# Patient Record
Sex: FEMALE | Race: WHITE | NOT HISPANIC OR LATINO | Employment: PART TIME | URBAN - METROPOLITAN AREA
[De-identification: names, ages, dates, MRNs, and addresses within clinical notes are randomized per-mention and may not be internally consistent; named-entity substitution may affect disease eponyms.]

---

## 2017-03-27 ENCOUNTER — GENERIC CONVERSION - ENCOUNTER (OUTPATIENT)
Dept: OTHER | Facility: OTHER | Age: 62
End: 2017-03-27

## 2017-04-21 ENCOUNTER — GENERIC CONVERSION - ENCOUNTER (OUTPATIENT)
Dept: OTHER | Facility: OTHER | Age: 62
End: 2017-04-21

## 2017-10-09 ENCOUNTER — HOSPITAL ENCOUNTER (EMERGENCY)
Facility: HOSPITAL | Age: 62
Discharge: HOME | End: 2017-10-09
Attending: EMERGENCY MEDICINE | Admitting: EMERGENCY MEDICINE
Payer: COMMERCIAL

## 2017-10-09 VITALS
SYSTOLIC BLOOD PRESSURE: 133 MMHG | WEIGHT: 199 LBS | HEART RATE: 96 BPM | OXYGEN SATURATION: 95 % | TEMPERATURE: 97.7 F | DIASTOLIC BLOOD PRESSURE: 70 MMHG | RESPIRATION RATE: 18 BRPM

## 2017-10-09 DIAGNOSIS — S16.1XXA STRAIN OF NECK MUSCLE, INITIAL ENCOUNTER: ICD-10-CM

## 2017-10-09 DIAGNOSIS — S09.90XA INJURY OF HEAD, INITIAL ENCOUNTER: Primary | ICD-10-CM

## 2017-10-09 PROCEDURE — 99283 EMERGENCY DEPT VISIT LOW MDM: CPT
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
## 2017-10-09 NOTE — DISCHARGE INSTRUCTIONS
Cervical Strain   WHAT YOU NEED TO KNOW:   A cervical strain is a stretched or torn muscle or tendon in your neck  Tendons are strong tissues that connect muscles to bones  Common causes of cervical strains include a car accident, a fall, or a sports injury  DISCHARGE INSTRUCTIONS:   Return to the emergency department if:   · You have pain or numbness from your shoulder down to your hand  · You have problems with your vision, hearing, or balance  · You feel confused or cannot concentrate  · You have problems with movement and strength  Contact your healthcare provider if:   · You have increased swelling or pain in your neck  · You have questions or concerns about your condition or care  Medicines: You may need any of the following:  · Acetaminophen  decreases pain and fever  It is available without a doctor's order  Ask how much to take and how often to take it  Follow directions  Read the labels of all other medicines you are using to see if they also contain acetaminophen, or ask your doctor or pharmacist  Acetaminophen can cause liver damage if not taken correctly  Do not use more than 4 grams (4,000 milligrams) total of acetaminophen in one day  · NSAIDs , such as ibuprofen, help decrease swelling, pain, and fever  This medicine is available with or without a doctor's order  NSAIDs can cause stomach bleeding or kidney problems in certain people  If you take blood thinner medicine, always ask your healthcare provider if NSAIDs are safe for you  Always read the medicine label and follow directions  · Muscle relaxers  help decrease pain and muscle spasms  · Prescription pain medicine  may be given  Ask your healthcare provider how to take this medicine safely  Some prescription pain medicines contain acetaminophen  Do not take other medicines that contain acetaminophen without talking to your healthcare provider  Too much acetaminophen may cause liver damage   Prescription pain medicine may cause constipation  Ask your healthcare provider how to prevent or treat constipation  · Take your medicine as directed  Contact your healthcare provider if you think your medicine is not helping or if you have side effects  Tell him or her if you are allergic to any medicine  Keep a list of the medicines, vitamins, and herbs you take  Include the amounts, and when and why you take them  Bring the list or the pill bottles to follow-up visits  Carry your medicine list with you in case of an emergency  Manage your symptoms:   · Apply heat  on your neck for 15 to 20 minutes, 4 to 6 times a day or as directed  Heat helps decrease pain, stiffness, and muscle spasms  · Begin gentle neck exercises  as soon as you can move your neck without pain  Exercises will help decrease stiffness and improve the strength and movement of your neck  Ask your healthcare provider what kind of exercises you should do  · Gradually return to your usual activities as directed  Stop if you have pain  Avoid activities that can cause more damage to your neck, such as heavy lifting or strenuous exercise  · Sleep without a pillow  to help decrease pain  Instead, roll a small towel tightly and place it under your neck  · Go to physical therapy as directed  A physical therapist teaches you exercises to help improve movement and strength, and to decrease pain  Prevent neck injury:   · Drive safely  Make sure everyone in your car wears a seatbelt  A seatbelt can save your life if you are in an accident  Do not use your cell phone when you are driving  This could distract you and cause an accident  Pull over if you need to make a call or send a text message  · Wear helmets, lifejackets, and protective gear  Always wear a helmet when you ride a bike or motorcycle, go skiing, or play sports that could cause a head injury  Wear protective equipment when you play sports   Wear a lifejacket when you are on a boat or doing water sports  Follow up with your healthcare provider as directed: You may be referred to an orthopedist or physical therapies  Write down your questions so you remember to ask them during your visits  © 2017 [address] [employer] Information is for End User's use only and may not be sold, redistributed or otherwise used for commercial purposes  All illustrations and images included in CareNotes® are the copyrighted property of [employer]  or [address]  The above information is an  only  It is not intended as medical advice for individual conditions or treatments  Talk to your doctor, nurse or pharmacist before following any medical regimen to see if it is safe and effective for you

## 2017-12-22 ENCOUNTER — GENERIC CONVERSION - ENCOUNTER (OUTPATIENT)
Dept: FAMILY MEDICINE CLINIC | Facility: CLINIC | Age: 62
End: 2017-12-22

## 2018-01-16 NOTE — RESULT NOTES
Verified Results  (1) LIPID PANEL, FASTING [id number] 10:47AM [facility] Order Number: [id number]     Test Name Result Flag Reference   CHOLESTEROL 256 mg/dL H    HDL,DIRECT 47 mg/dL  40-60   Specimen collection should occur prior to Metamizole administration due to the potential for falsely depressed results  LDL CHOLESTEROL CALCULATED 178 mg/dL H 0-100   Triglyceride:         Normal              <150 mg/dl       Borderline High    150-199 mg/dl       High               200-499 mg/dl       Very High          >499 mg/dl  Cholesterol:         Desirable        <200 mg/dl      Borderline High  200-239 mg/dl      High             >239 mg/dl  HDL Cholesterol:        High    >59 mg/dL      Low     <41 mg/dL  LDL CALCULATED:    This screening LDL is a calculated result  It does not have the accuracy of the Direct Measured LDL in the monitoring of patients with hyperlipidemia and/or statin therapy  Direct Measure LDL (CIV662) must be ordered separately in these patients  TRIGLYCERIDES 156 mg/dL H <=150   Specimen collection should occur prior to N-Acetylcysteine or Metamizole administration due to the potential for falsely depressed results  (1) TSH 22Oct2016 10:47AM Felix Mad   TW Order Number: [id number]     Test Name Result Flag Reference   TSH 2 520 uIU/mL  0 358-3 740   Patients undergoing fluorescein dye angiography may retain small amounts of fluorescein in the body for 48-72 hours post procedure  Samples containing fluorescein can produce falsely depressed TSH values  If the patient had this procedure,a specimen should be resubmitted post fluorescein clearance            The recommended reference ranges for TSH during pregnancy are as follows:  First trimester 0 1 to 2 5 uIU/mL  Second trimester  0 2 to 3 0 uIU/mL  Third trimester 0 3 to 3 0 uIU/m     (1) COMPREHENSIVE METABOLIC PANEL [id number] 26:65CM [facility] Order Number: [id number]     Test Name Result Flag Reference   GLUCOSE,RANDM 81 mg/dL     If the patient is fasting, the ADA then defines impaired fasting glucose as > 100 mg/dL and diabetes as > or equal to 123 mg/dL  SODIUM 134 mmol/L L 136-145   POTASSIUM 4 7 mmol/L  3 5-5 3   CHLORIDE 101 mmol/L  100-108   CARBON DIOXIDE 28 mmol/L  21-32   ANION GAP (CALC) 5 mmol/L  4-13   BLOOD UREA NITROGEN 30 mg/dL H 5-25   CREATININE 1 12 mg/dL  0 60-1 30   Standardized to IDMS reference method   CALCIUM 9 4 mg/dL  8 3-10 1   BILI, TOTAL 0 34 mg/dL  0 20-1 00   ALK PHOSPHATAS 77 U/L     ALT (SGPT) 25 U/L  12-78   AST(SGOT) 8 U/L  5-45   ALBUMIN 3 8 g/dL  3 5-5 0   TOTAL PROTEIN 7 8 g/dL  6 4-8 2   eGFR Non-African American 49 5 ml/min/1 73sq [facility] Disease Education Program recommendations are as follows:  GFR calculation is accurate only with a steady state creatinine  Chronic Kidney disease less than 60 ml/min/1 73 sq  meters  Kidney failure less than 15 ml/min/1 73 sq  meters  (1) CBC/PLT/DIFF 22Oct2016 10:47AM Michelle Klein Order Number: [id number]     Test Name Result Flag Reference   WBC COUNT 10 92 Thousand/uL H 4 31-10 16   RBC COUNT 4 82 Million/uL  3 81-5 12   HEMOGLOBIN 13 6 g/dL  11 5-15 4   HEMATOCRIT 40 7 %  34 8-46  1   MCV 84 fL  82-98   MCH 28 2 pg  26 8-34 3   MCHC 33 4 g/dL  31 4-37 4   RDW 13 9 %  11 6-15 1   MPV 11 1 fL  8 9-12 7   PLATELET COUNT 882 Thousands/uL  149-390   nRBC AUTOMATED 0 /100 WBCs     NEUTROPHILS RELATIVE PERCENT 51 %  43-75   LYMPHOCYTES RELATIVE PERCENT 41 %  14-44   MONOCYTES RELATIVE PERCENT 7 %  4-12   EOSINOPHILS RELATIVE PERCENT 1 %  0-6   BASOPHILS RELATIVE PERCENT 0 %  0-1   NEUTROPHILS ABSOLUTE COUNT 5 53 Thousands/?L  1 85-7 62   LYMPHOCYTES ABSOLUTE COUNT 4 42 Thousands/?L  0 60-4 47   MONOCYTES ABSOLUTE COUNT 0 72 Thousand/?L  0 17-1 22   EOSINOPHILS ABSOLUTE COUNT 0 15 Thousand/?L  0 00-0 61   BASOPHILS ABSOLUTE COUNT 0 04 Thousands/?L  0 00-0 10     (1) HEMOGLOBIN A1C [id number] 10:47AM Read Ivonne Order Number: [id number]     Test Name Result Flag Reference   HEMOGLOBIN A1C 6 3 %  4 2-6 3   EST  AVG   GLUCOSE 134 mg/dl

## 2018-01-16 NOTE — RESULT NOTES
Verified Results  MAMMO SCREENING BILATERAL W 3D & CAD [id number] 06:46PM Zaria Juarez Order Number: [id number]    - Patient Instructions: To schedule this appointment, please contact Central Scheduling at [phone]  Do not wear any perfume, powder, lotion or deodorant on breast or underarm area  Please bring your doctors order, referral (if needed) and insurance information with you on the day of the test  Failure to bring this information may result in this test being rescheduled  Arrive 15 minutes prior to your appointment time to register  On the day of your test, please bring any prior mammogram or breast studies with you that were not performed at a [facility]  Failure to bring prior exams may result in your test needing to be rescheduled   Order Number: [id number]    - Patient Instructions: To schedule this appointment, please contact Central Scheduling at [phone]  Do not wear any perfume, powder, lotion or deodorant on breast or underarm area  Please bring your doctors order, referral (if needed) and insurance information with you on the day of the test  Failure to bring this information may result in this test being rescheduled  Arrive 15 minutes prior to your appointment time to register  On the day of your test, please bring any prior mammogram or breast studies with you that were not performed at a [facility]  Failure to bring prior exams may result in your test needing to be rescheduled  Test Name Result Flag Reference   MAMMO SCREENING BILATERAL W 3D & CAD (Report)     Patient History:   Patient is postmenopausal and has history of other cancer  Family history of breast cancer in sister at age 48 or over  Benign excisional biopsy of the left breast, 1985  Took estrogen for 4 years  Patient has never smoked  Patient's BMI is 32 4  Reason for exam: screening (asymptomatic)       Mammo Screening Bilateral W DBT and CAD: December 8, 2016 - Check   In #: [de-identified]   2D/3D Procedure   3D views: Bilateral MLO view(s) were taken  2D views: Bilateral CC view(s) were taken  Technologist: ERIN Townsend (ERIN)(M)   Prior study comparison: May 26, 2015, mammo screening bilateral W   CAD, performed at [facility]  May 21, 2014, mammo    screening bilateral W CAD, performed at [facility]  May 14, 2013, mammo screening bilateral W CAD, performed at    [facility]  August 24, 2009, mammo screening    bilateral W CAD, performed at [facility]  There are scattered fibroglandular densities  A combination of    mediolateral oblique 3-D tomographic slices as well as standard    two-dimensional orthogonal images were obtained  The parenchymal pattern appears stable  No dominant soft tissue    mass or suspicious calcifications are noted  The skin and nipple   contours are within normal limits  No mammographic evidence of malignancy  No    significant changes when compared with prior studies  ASSESSMENT: BiRad:1 - Negative     Recommendation:   Routine screening mammogram in 1 year  A reminder letter will be   scheduled  8-10% of cancers will be missed on mammography  Management of a    palpable abnormality must be based on clinical grounds  Patients    will be notified of their results via letter from our facility  Accredited by [employer] of Radiology and FDA       Transcription Location: [facility] 98: [id number]     Risk Value(s):   Tyrer-Cuzick 10 Year: 7 550%, Tyrer-Cuzick Lifetime: 17 533%,    Myriad Table: 1 5%, ZENIA 5 Year: 4 1%, NCI Lifetime: 18 3%   Signed by:   Miriam Larsen MD   12/9/16

## 2018-01-16 NOTE — PROGRESS NOTES
Assessment    1  Encounter for preventive health examination (V70 0) (Z00 00)   2  Polyp, colonic (211 3) (K63 5)   3  Encounter for colorectal cancer screening (V76 51) (Z12 11,Z12 12)   4  Encounter for screening breast examination (V76 10) (Z12 39)   5  Need for zoster vaccination (V04 89) (Z23)   6  Need for vaccination with 13-polyvalent pneumococcal conjugate vaccine (V03 82) (Z23)   7  Encounter for annual routine gynecological examination (V72 31) (Z01 419)   8  Former smoker (V15 82) (B77 500)    Plan  Encounter for annual routine gynecological examination, Encounter for screening breast  examination    · 3 - Dexter Diaz ([facility]) Physician Referral  Consult  Status: Hold For -  Scheduling  Requested for: [id number]  are Referring to a non- Preferred Provider : Established Patient  Care Summary provided  : Yes  Encounter for colorectal cancer screening, Health Maintenance, Encounter for screening  breast examination    · * MAMMO SCREENING BILATERAL W CAD; Status:Hold For - Scheduling; Requested  for:28Oct2016;   Encounter for colorectal cancer screening, Health Maintenance, Polyp, colonic    · 2 - Lucy Martinez MD  (Gastroenterology) Physician Referral  Consult  Status: Hold For -  Scheduling  Requested for: 01QKX6979  Care Summary provided  : Yes  Health Maintenance    · Calcium 600+D 600-400 MG-UNIT Oral Tablet; TAKE 2 TABLET Daily   · Multiple Vitamins Oral Tablet;  Take one daily   · Follow-up visit in 1 year Evaluation and Treatment  Follow-up  Status: Hold For -  Scheduling  Requested for: 28Oct2016   · Alcohol misuse can be a problem with advancing age ; Status:Complete;   Done:  19IVQ3719 12:35PM   · Always use a seat belt and shoulder strap when riding or driving a motor vehicle ;  Status:Complete;   Done: 78BUX2378 12:35PM   · Brush your teeth freq1 and floss at least once a day ; Status:Complete;   Done:  81MXM5834 12:35PM   · Decreasing the stress in your life may help your condition improve ; Status:Complete;    Done: 05FBO5782 12:35PM   · Diets that are low in carbohydrates and high in protein are very popular for weight loss ;  Status:Complete;   Done: 28Oct2016 12:35PM   · Eat a low fat and low cholesterol diet ; Status:Complete;   Done: 28Oct2016 12:35PM   · Eat foods that are high in calcium ; Status:Complete;   Done: 28Oct2016 12:35PM   · Keep a diary of when and what you eat ; Status:Complete;   Done: 28Oct2016 12:35PM   · Regular aerobic exercise can help reduce stress ; Status:Complete;   Done: 28Oct2016  12:35PM   · Stretch and warm up your muscles during the first 10 minutes , then cool down your  muscles for the last 10 minutes of exercise ; Status:Complete;   Done: 81PIK8637  12:35PM   · There are many exercise options for seniors ; Status:Complete;   Done: 86IFS3495  12:35PM   · There are many ways to reduce your risk of catching or spreading a sexually transmitted  Infection ; Status:Complete;   Done: 28Oct2016 12:35PM   · There are ways to decrease your stress and improve your sense of well-being  We  encourage you to keep active and exercise regularly  Make time to take care of yourself  and participate in activities that you enjoy  Stay connected to friends and family that can  support and comfort you  If at any time you have thoughts of harming yourself or  someone else, contact us immediately ; Status:Active; Requested for:28Oct2016;    · Use a sun block product with an SPF of 15 or more ; Status:Complete;   Done:  47QVI5083 12:35PM   · Using a latex condom can help prevent pregnancy   It can also help to prevent the spread  of sexually transmitted infections ; Status:Complete;   Done: 98DTS3600 12:35PM   · Vitamins can help you get daily requirements that your diet may not be giving you ;  Status:Complete;   Done: 35RRU1797 12:35PM   · We recommend a colonoscopy ; Status:Complete;   Done: 85RMD5681 12:35PM   · We recommend routine visits to a dentist ; Status:Complete; Done: 31RSI1260 12:35PM   · We recommend that you bring your body mass index down to 26 ; Status:Complete;    Done: 28Oct2016 12:35PM   · We recommend that you create an advance directive ; Status:Complete;   Done:  21BEB3709 12:35PM   · We recommend that you follow these steps to lower your risk of osteoporosis  ;  Status:Complete;   Done: 85JCE7546 12:35PM   · Call [phone] if: You find a new or different kind of lump in your breast ;  Status:Complete;   Done: 69LTJ4095 12:35PM   · Call [phone] if: You have any bleeding from the vagina ; Status:Complete;    Done: 81AEH4006 12:35PM   · Call [phone] if: You have any warning signs of skin cancer ; Status:Complete;    Done: 13ZMV8233 12:35PM   · Call [phone] if: You experience a new kind of chest pain (angina) or pressure ;  Status:Complete;   Done: 28Oct2016 12:35PM   · Stop: Fluzone Quadrivalent 0 5 ML Intramuscular Suspension  Hyperlipidemia, Hypertension, Metabolic syndrome X, Polycystic ovarian syndrome    · (1) COMPREHENSIVE METABOLIC PANEL; Status:Active; Requested for:22Apr2017;    · (1) HEMOGLOBIN A1C; Status:Active; Requested for:22Apr2017;   Hyperlipidemia, Hypertension, Metabolic syndrome X, Polycystic ovarian syndrome,  Statin intolerance    · (1) LIPID PANEL, FASTING; Status:Active; Requested for:22Apr2017;   Hyperlipidemia, Statin intolerance    · 1 - Jennifer Dubose DO  (Cardiology) Physician Referral  Consult  Status: Active   Requested for: 10YRG3803  Care Summary provided  : Yes  Need for vaccination with 13-polyvalent pneumococcal conjugate vaccine    · Prevnar 13 Intramuscular Suspension; INJECT 0 5  ML Intramuscular; To Be  Done: 00WKI2931  Need for zoster vaccination    · Zoster (Zostavax)    Discussion/Summary  health maintenance visit Currently, she eats a healthy diet and has an inadequate exercise regimen   the risks and benefits of cervical cancer screening were discussed cervical cancer screening is managed by Idania Parham Breast cancer screening: the risks and benefits of breast cancer screening were discussed, self breast exam technique was taught, monthly self breast exam was advised and mammogram has been ordered  Colorectal cancer screening: the risks and benefits of colorectal cancer screening were discussed, colonoscopy has been ordered and colorectal cancer screening is managed by Dr Kg Gaitan patient referred  Osteoporosis screening: bone mineral density testing is current  Screening lab work includes reviewed  The immunizations will be given as outlined in the orders  She was advised to be evaluated by an optometrist  Advice and education were given regarding nutrition, aerobic exercise, weight loss, calcium supplements, vitamin D supplements, self skin examination and seat belt use  Patient discussion: discussed with the patient  Dsicussed with patient her A1c is 6 3 on metformin    will maintain that patient needs mammogram, colosncopy Patent needs to see Dr Rene Hutchinson Her LDL is at 178 with diagnosis of metabolic syndrome, hypertension and hyperlipidemia I want her to be evaluated for lipid therapy needs Pateint will have her mammogram and will see GYN Patient also will get flu shot at her job  Chief Complaint  HERE TODAY FOR YEARLY CHECK UP      History of Present Illness  HM, Adult Female: The patient is being seen for a health maintenance evaluation  The last health maintenance visit was 6 month(s) ago  Social History: She is   Work status: working part-time and occupation: nurse  The patient is a former cigarette smoker  She reports rare alcohol use  She has never used illicit drugs  General Health: The patient's health since the last visit is described as good  She has regular dental visits  She denies vision problems  She denies hearing loss  Immunizations status:  needs shots  Lifestyle:  She consumes a diverse and healthy diet  She has weight concerns  She does not exercise regularly   She does not use tobacco  She denies drug use  Patient is sporadic with exercise  Reproductive health: the patient is postmenopausal   she is not sexually active  Screening: Cervical cancer screening includes a pap smear performed 2015  Breast cancer screening includes a mammogram performed 2015  Colorectal cancer screening includes a colonoscopy performed 2009 had polyp needs repeat  Metabolic screening includes lipid profile performed last week, glucose screening performed last week, thyroid function test performed last week and DEXA performed 2015  HPI: Patient is here for health maintainence visit Patient last labs reviewed  and HbA1c of 6 3 with slighlty elevated WBC count The eleveated WBC likely due to her having had pneumonia earlier in the month patien tis overdue for colonscoopy as her last was 2009 and polyp found She will see them She alos has not had mammogram She will have flu shot at work and have shingles and prevnar now Patient has intolerance to all statins and so not taking any      Review of Systems    Constitutional: No fever, no chills, feels well, no tiredness, no recent weight gain or weight loss  Eyes: no eye pain and no eyesight problems  ENT: no complaints of earache, no loss of hearing, no nose bleeds, no nasal discharge, no sore throat, no hoarseness  Cardiovascular: no chest pain, no intermittent leg claudication, no palpitations and no lower extremity edema  Respiratory: No complaints of shortness of breath, no wheezing, no cough, no SOB on exertion, no orthopnea, no PND  Gastrointestinal: No complaints of abdominal pain, no constipation, no nausea or vomiting, no diarrhea, no bloody stools  Genitourinary: no dysuria and no incontinence  Musculoskeletal: no arthralgias and no myalgias  Integumentary: no rashes     Neurological: No complaints of headache, no confusion, no convulsions, no numbness, no dizziness or fainting, no tingling, no limb weakness, no difficulty walking  Psychiatric: nerves are stable on the sertraline  Active Problems    1  Depression (311) (F32 9)   2  Encounter for annual routine gynecological examination (V72 31) (Z01 419)   3  Fibromyalgia (729 1) (M79 7)   4  Hyperlipidemia (272 4) (E78 5)   5  Hypertension (401 9) (I10)   6  Irritable bowel syndrome (564 1) (K58 9)   7  Metabolic syndrome X (419 1) (E88 81)   8  Polycystic ovarian syndrome (256 4) (E28 2)   9  Polyp, colonic (211 3) (K63 5)   10  Visit for screening mammogram (V76 12) (Z12 31)    Past Medical History    · History of Community acquired pneumonia (5) (J18 9)   · History of migraine (V12 49) (Z86 69)    Surgical History    · History of Complete Colonoscopy   · History of Total Abdominal Hysterectomy    Family History  Mother    · Family history of Coronary arteriosclerosis   · Family history of diabetes mellitus (V18 0) (Z83 3)   · Family history of renal failure (V18 69) (Z84 1)  Father    · Family history of Coronary arteriosclerosis  Sister    · Family history of malignant neoplasm of breast (V16 3) (Z80 3)  Brother    · Family history of cerebral palsy (V17 2) (Z82 0)  Family History    · Family history of hypertension (V17 49) (Z82 49)   · Family history of malignant neoplasm (V16 9) (Z80 9)    Social History    ·    · Former smoker (Q26 24) (K42 890)    Current Meds   1  Allegra 180 MG TABS; TAKE 1 TABLET DAILY; Therapy: (Recorded:97Jxk9732) to Recorded   2  Flonase 50 MCG/ACT SUSP; USE 2 SPRAYS IN EACH NOSTRIL TWICE DAILY; Therapy: (Recorded:34Brs7090) to Recorded   3  Glucophage 500 MG Oral Tablet; TAKE 1 TABLET 3 TIMES DAILY WITH MEALS; Therapy: (Recorded:31Mar2016) to Recorded   4  Lisinopril 10 MG Oral Tablet; Take 1 tablet daily; Therapy: (Recorded:36Btj1773) to Recorded   5  Montelukast Sodium 10 MG Oral Tablet; TAKE ONE TABLET BY MOUTH ONCE DAILY    Requested for: 00AFT6739; Last Rx:37Hum3221 Ordered   6   PriLOSEC 20 MG Oral Capsule Delayed Release; take 1 capsule daily; Therapy: (Recorded:35Sas9029) to Recorded   7  Sertraline HCl - 100 MG Oral Tablet; take 1 tablet by mouth daily; Therapy: 70UHC4257 to (Evaluate:76Rmb7557)  Requested for: 74XUN4353; Last   Rx:92Zej2690 Ordered   8  Ventolin  (90 Base) MCG/ACT Inhalation Aerosol Solution; INHALE TWO PUFFS   BY MOUTH FOUR TIMES DAILY AS NEEDED; Therapy: 49UDW9692 to (Last Rx:17Oct2016)  Requested for: 73Npz6182 Ordered    Allergies    1  guaifenesin   2  Aspirin TABS   3  Fish Oil CAPS   4  Keflex CAPS   5  simvastatin   6  Welchol   7  Wellbutrin TABS    Vitals   Recorded: 00LVD9680 20:49JY   Systolic 410   Diastolic 72   Temperature 97 1 F   Height 5 ft 4 5 in   Weight 191 lb    BMI Calculated 32 28   BSA Calculated 1 93     Physical Exam    Constitutional   General appearance: No acute distress, well appearing and well nourished  Head and Face   Head and face: Normal     Eyes   Conjunctiva and lids: No swelling, erythema or discharge  Pupils and irises: Equal, round, reactive to light  Ophthalmoscopic examination: Normal fundi and optic discs  Ears, Nose, Mouth, and Throat   External inspection of ears and nose: Normal     Otoscopic examination: Tympanic membranes translucent with normal light reflex  Canals patent without erythema  Hearing: Normal     Nasal mucosa, septum, and turbinates: Normal without edema or erythema  Lips, teeth, and gums: Normal, good dentition  Oropharynx: Normal with no erythema, edema, exudate or lesions  Neck   Neck: Supple, symmetric, trachea midline, no masses  Thyroid: Normal, no thyromegaly  Pulmonary   Respiratory effort: No increased work of breathing or signs of respiratory distress  Auscultation of lungs: Clear to auscultation  Cardiovascular   Auscultation of heart: Normal rate and rhythm, normal S1 and S2, no murmurs  Carotid pulses: 2+ bilaterally      Peripheral vascular exam: Normal     Examination of extremities for edema and/or varicosities: Normal     Abdomen   Abdomen: Non-tender, no masses  Liver and spleen: No hepatomegaly or splenomegaly  Lymphatic   Palpation of lymph nodes in neck: No lymphadenopathy  Musculoskeletal   Gait and station: Normal     Skin   Skin and subcutaneous tissue: Normal without rashes or lesions  Neurologic   Cranial nerves: Cranial nerves II-XII intact  Psychiatric   Judgment and insight: Normal     Orientation to person, place, and time: Normal     Recent and remote memory: Intact      Mood and affect: Normal        Signatures   Electronically signed by : Yuli Huber DO; Oct 28 2016 12:43PM EST                       (Author)